# Patient Record
Sex: FEMALE | Race: WHITE | Employment: PART TIME | URBAN - NONMETROPOLITAN AREA
[De-identification: names, ages, dates, MRNs, and addresses within clinical notes are randomized per-mention and may not be internally consistent; named-entity substitution may affect disease eponyms.]

---

## 2023-05-30 ENCOUNTER — HOSPITAL ENCOUNTER (EMERGENCY)
Age: 23
Discharge: HOME | End: 2023-05-30
Payer: COMMERCIAL

## 2023-05-30 VITALS
OXYGEN SATURATION: 97 % | WEIGHT: 275 LBS | HEART RATE: 107 BPM | RESPIRATION RATE: 18 BRPM | TEMPERATURE: 98 F | SYSTOLIC BLOOD PRESSURE: 134 MMHG | DIASTOLIC BLOOD PRESSURE: 90 MMHG

## 2023-05-30 DIAGNOSIS — L05.01 PILONIDAL ABSCESS: Primary | ICD-10-CM

## 2023-05-30 PROCEDURE — 10080 I&D PILONIDAL CYST SIMPLE: CPT

## 2023-05-30 PROCEDURE — 99283 EMERGENCY DEPT VISIT LOW MDM: CPT

## 2023-05-30 PROCEDURE — 87075 CULTR BACTERIA EXCEPT BLOOD: CPT

## 2023-05-30 PROCEDURE — 87205 SMEAR GRAM STAIN: CPT

## 2023-05-30 PROCEDURE — 6370000000 HC RX 637 (ALT 250 FOR IP): Performed by: PHYSICIAN ASSISTANT

## 2023-05-30 PROCEDURE — 87070 CULTURE OTHR SPECIMN AEROBIC: CPT

## 2023-05-30 RX ORDER — SULFAMETHOXAZOLE AND TRIMETHOPRIM 800; 160 MG/1; MG/1
1 TABLET ORAL 2 TIMES DAILY
Qty: 20 TABLET | Refills: 0 | Status: SHIPPED | OUTPATIENT
Start: 2023-05-30 | End: 2023-06-09

## 2023-05-30 RX ADMIN — Medication 3 ML: at 15:30

## 2023-05-30 ASSESSMENT — ENCOUNTER SYMPTOMS
NAUSEA: 0
SHORTNESS OF BREATH: 0
APNEA: 0
BACK PAIN: 0
PHOTOPHOBIA: 0
SORE THROAT: 0
EYE DISCHARGE: 0
EYE PAIN: 0
COUGH: 0
ABDOMINAL DISTENTION: 0
RHINORRHEA: 0
COLOR CHANGE: 0
ABDOMINAL PAIN: 0

## 2023-05-30 NOTE — ED PROVIDER NOTES
[facility] EMERGENCY DEPT  eMERGENCYdEPARTMENT eNCOUnter      Pt Name: Adan Nieto  MRN: [id number]  [place] 2000  Date of evaluation: 5/30/2023  Provider:AZEB Duran    CHIEF COMPLAINT       Chief Complaint   Patient presents with    Cyst     Pt reports pilonidal cyst and 5 prior I&D for this          HISTORY OF PRESENT ILLNESS  (Location/Symptom, Timing/Onset, Context/Setting, Quality, Duration, Modifying Factors, Severity.)   Adan Nieto is a 20 y.o. female who presents to the emergency department with concern for pilonidal abscess has had 5 prior ID for this onset a few days denies fever chills no active discharge. HPI    Nursing Notes were reviewed and I agree. REVIEW OF SYSTEMS    (2-9 systems for level 4, 10 or more for level 5)     Review of Systems   Constitutional:  Negative for activity change, appetite change, chills and fever. HENT:  Negative for congestion, postnasal drip, rhinorrhea and sore throat. Eyes:  Negative for photophobia, pain, discharge and visual disturbance. Respiratory:  Negative for apnea, cough and shortness of breath. Cardiovascular:  Negative for chest pain and leg swelling. Gastrointestinal:  Negative for abdominal distention, abdominal pain and nausea. Genitourinary:  Negative for vaginal bleeding. Musculoskeletal:  Negative for arthralgias, back pain, joint swelling, neck pain and neck stiffness. Skin:  Positive for wound. Negative for color change and rash. Neurological:  Negative for dizziness, syncope, facial asymmetry and headaches. Hematological:  Negative for adenopathy. Does not bruise/bleed easily. Psychiatric/Behavioral:  Negative for agitation, behavioral problems and confusion. Except as noted above the remainder of the review of systems was reviewed and negative. PAST MEDICAL HISTORY   History reviewed. No pertinent past medical history. SURGICAL HISTORY     History reviewed. No pertinent surgical history.       CURRENT

## 2023-05-31 ENCOUNTER — TELEPHONE (OUTPATIENT)
Dept: SURGERY | Age: 23
End: 2023-05-31

## 2023-05-31 NOTE — TELEPHONE ENCOUNTER
Mary Ann's upcoming appointment is needing switched to a new patient visit instead of office visit.  [address] unable to modify  Please advise  Thank you

## 2023-06-02 ENCOUNTER — OFFICE VISIT (OUTPATIENT)
Dept: SURGERY | Age: 23
End: 2023-06-02

## 2023-06-02 VITALS
HEART RATE: 100 BPM | HEIGHT: 71 IN | WEIGHT: 256 LBS | OXYGEN SATURATION: 98 % | TEMPERATURE: 97.4 F | BODY MASS INDEX: 35.84 KG/M2

## 2023-06-02 DIAGNOSIS — L05.01 PILONIDAL ABSCESS: ICD-10-CM

## 2023-06-02 ASSESSMENT — ENCOUNTER SYMPTOMS
EYE REDNESS: 0
ABDOMINAL PAIN: 0
SORE THROAT: 0
CONSTIPATION: 0
CHEST TIGHTNESS: 0
SHORTNESS OF BREATH: 0
FACIAL SWELLING: 0
DIARRHEA: 0
ABDOMINAL DISTENTION: 0
BACK PAIN: 0
VOMITING: 0
EYE DISCHARGE: 0
WHEEZING: 0
EYE PAIN: 0
CHOKING: 0

## 2023-06-02 NOTE — PROGRESS NOTES
SUBJECTIVE:  Ms. Carolyn Lowery is a 20 y.o. female who presents today with an abscess of her gluteal cleft. She has had at least 6 of these requiring I&D in the ER over the last few years. Recently seen for I&D in the ER. Now on Bactrim. No past medical history on file. No past surgical history on file. Current Outpatient Medications   Medication Sig Dispense Refill    sulfamethoxazole-trimethoprim (BACTRIM DS) 800-160 MG per tablet Take 1 tablet by mouth 2 times daily for 10 days 20 tablet 0     No current facility-administered medications for this visit. Allergies: Pcn [penicillins]    No family history on file. Social History     Tobacco Use    Smoking status: Every Day     Packs/day: 0.50     Types: Cigarettes    Smokeless tobacco: Never   Substance Use Topics    Alcohol use: Never       Review of Systems   Constitutional:  Negative for activity change, chills, fatigue and fever. HENT:  Negative for facial swelling, hearing loss and sore throat. Eyes:  Negative for pain, discharge and redness. Respiratory:  Negative for choking, chest tightness, shortness of breath and wheezing. Cardiovascular:  Negative for chest pain and palpitations. Gastrointestinal:  Negative for abdominal distention, abdominal pain, constipation, diarrhea and vomiting. Musculoskeletal:  Negative for back pain, neck pain and neck stiffness. Neurological:  Negative for dizziness, speech difficulty, weakness and numbness. Psychiatric/Behavioral:  Negative for agitation, hallucinations and suicidal ideas. OBJECTIVE:  Pulse 100   Temp 97.4 °F (36.3 °C) (Temporal)   Ht 5' 11\" (1.803 m)   Wt 256 lb (116.1 kg)   SpO2 98%   BMI 35.70 kg/m²   Physical Exam  Constitutional:       Appearance: Normal appearance. HENT:      Head: Normocephalic and atraumatic.       Right Ear: External ear normal.      Left Ear: External ear normal.      Nose: Nose normal.   Eyes:      Pupils: Pupils are equal, round, and reactive to

## 2023-06-03 LAB
BACTERIA SPEC ANAEROBE CULT: ABNORMAL
BACTERIA SPEC ANAEROBE CULT: ABNORMAL
BACTERIA SPEC ANAEROBE+AEROBE CULT: ABNORMAL
BACTERIA SPEC ANAEROBE+AEROBE CULT: ABNORMAL
GRAM STN SPEC: ABNORMAL
ORGANISM: ABNORMAL

## 2023-07-08 ENCOUNTER — HOSPITAL ENCOUNTER (EMERGENCY)
Age: 23
Discharge: HOME | End: 2023-07-08
Attending: PEDIATRICS
Payer: COMMERCIAL

## 2023-07-08 VITALS
DIASTOLIC BLOOD PRESSURE: 76 MMHG | BODY MASS INDEX: 34.87 KG/M2 | SYSTOLIC BLOOD PRESSURE: 132 MMHG | RESPIRATION RATE: 20 BRPM | TEMPERATURE: 98 F | WEIGHT: 250 LBS | OXYGEN SATURATION: 98 % | HEART RATE: 78 BPM

## 2023-07-08 DIAGNOSIS — L05.01 CYST, PILONIDAL, WITH ABSCESS: Primary | ICD-10-CM

## 2023-07-08 PROCEDURE — 99283 EMERGENCY DEPT VISIT LOW MDM: CPT

## 2023-07-08 PROCEDURE — 87075 CULTR BACTERIA EXCEPT BLOOD: CPT

## 2023-07-08 PROCEDURE — 10081 I&D PILONIDAL CYST COMP: CPT

## 2023-07-08 PROCEDURE — 87070 CULTURE OTHR SPECIMN AEROBIC: CPT

## 2023-07-08 PROCEDURE — 87205 SMEAR GRAM STAIN: CPT

## 2023-07-08 PROCEDURE — 2500000003 HC RX 250 WO HCPCS: Performed by: PEDIATRICS

## 2023-07-08 RX ORDER — LIDOCAINE HYDROCHLORIDE AND EPINEPHRINE 10; 10 MG/ML; UG/ML
20 INJECTION, SOLUTION INFILTRATION; PERINEURAL ONCE
Status: COMPLETED | OUTPATIENT
Start: 2023-07-08 | End: 2023-07-08

## 2023-07-08 RX ORDER — SULFAMETHOXAZOLE AND TRIMETHOPRIM 800; 160 MG/1; MG/1
1 TABLET ORAL ONCE
Status: DISCONTINUED | OUTPATIENT
Start: 2023-07-08 | End: 2023-07-09 | Stop reason: HOSPADM

## 2023-07-08 RX ADMIN — LIDOCAINE HYDROCHLORIDE,EPINEPHRINE BITARTRATE 20 ML: 10; .01 INJECTION, SOLUTION INFILTRATION; PERINEURAL at 21:15

## 2023-07-09 LAB
BACTERIA SPEC ANAEROBE+AEROBE CULT: NORMAL
GRAM STN SPEC: NORMAL

## 2023-07-09 RX ORDER — SULFAMETHOXAZOLE AND TRIMETHOPRIM 800; 160 MG/1; MG/1
1 TABLET ORAL 2 TIMES DAILY
Qty: 14 TABLET | Refills: 0 | Status: SHIPPED | OUTPATIENT
Start: 2023-07-09 | End: 2023-07-16

## 2023-07-12 LAB
BACTERIA SPEC ANAEROBE+AEROBE CULT: ABNORMAL
GRAM STN SPEC: ABNORMAL
ORGANISM: ABNORMAL

## 2023-09-27 ENCOUNTER — HOSPITAL ENCOUNTER (EMERGENCY)
Age: 23
Discharge: HOME | End: 2023-09-27

## 2023-09-27 VITALS
HEIGHT: 71 IN | RESPIRATION RATE: 16 BRPM | DIASTOLIC BLOOD PRESSURE: 78 MMHG | OXYGEN SATURATION: 98 % | TEMPERATURE: 98.3 F | SYSTOLIC BLOOD PRESSURE: 135 MMHG | BODY MASS INDEX: 35 KG/M2 | WEIGHT: 250 LBS | HEART RATE: 98 BPM

## 2023-09-27 DIAGNOSIS — L05.01 PILONIDAL CYST WITH ABSCESS: Primary | ICD-10-CM

## 2023-09-27 PROCEDURE — 87070 CULTURE OTHR SPECIMN AEROBIC: CPT

## 2023-09-27 PROCEDURE — 87075 CULTR BACTERIA EXCEPT BLOOD: CPT

## 2023-09-27 PROCEDURE — 10080 I&D PILONIDAL CYST SIMPLE: CPT

## 2023-09-27 PROCEDURE — 99283 EMERGENCY DEPT VISIT LOW MDM: CPT

## 2023-09-27 PROCEDURE — 87205 SMEAR GRAM STAIN: CPT

## 2023-09-27 RX ORDER — SULFAMETHOXAZOLE AND TRIMETHOPRIM 800; 160 MG/1; MG/1
1 TABLET ORAL 2 TIMES DAILY
Qty: 20 TABLET | Refills: 0 | Status: SHIPPED | OUTPATIENT
Start: 2023-09-27 | End: 2023-10-07

## 2023-09-27 ASSESSMENT — ENCOUNTER SYMPTOMS: VOMITING: 0

## 2023-09-27 NOTE — ED PROVIDER NOTES
[facility] EMERGENCY DEPT  eMERGENCY dEPARTMENT eNCOUnter      Pt Name: Diego Sorto  MRN: [id number]  [address] [facility] 2000  Date of evaluation: 9/27/2023  Provider: DUSTY Mejia    CHIEF COMPLAINT       Chief Complaint   Patient presents with    Cyst     Recurrent pilonidal cysts         HISTORY OF PRESENT ILLNESS   (Location/Symptom, Timing/Onset,Context/Setting, Quality, Duration, Modifying Factors, Severity)  Note limiting factors. Diego Sorto is a 24 y.o. female who presents to the emergency department with a pilonidal cyst that keeps reoccurring. She tells this is about the 8th time she has had it drained. NO fever or vomiting. Pt says she can't afford a surgery to have the cyst removed     The history is provided by the patient. Abscess  Location:  Pelvis  Pelvic abscess location:  Gluteal cleft  Size:  2  Abscess quality: induration, painful and warmth    Abscess quality: no redness    Red streaking: no    Duration:  1 day  Progression:  Worsening  Chronicity:  Recurrent  Associated symptoms: no fever and no vomiting    Risk factors: prior abscess        NursingNotes were reviewed. REVIEW OF SYSTEMS    (2-9 systems for level 4, 10 or more for level 5)     Review of Systems   Constitutional:  Negative for fever. Gastrointestinal:  Negative for vomiting. Skin:  Positive for wound. Except as noted above the remainder of the review of systems was reviewed and negative. PAST MEDICAL HISTORY   History reviewed. No pertinent past medical history. SURGICALHISTORY     History reviewed. No pertinent surgical history. CURRENT MEDICATIONS       Discharge Medication List as of 9/27/2023  3:27 PM               Pcn [penicillins]    FAMILY HISTORY     History reviewed. No pertinent family history.        SOCIAL HISTORY       Social History     Socioeconomic History    Marital status: Single     Spouse name: None    Number of children: None    Years of education: None    Highest education

## 2023-10-01 LAB
BACTERIA SPEC ANAEROBE CULT: NORMAL
BACTERIA SPEC ANAEROBE+AEROBE CULT: NORMAL
GRAM STN SPEC: NORMAL

## 2023-10-25 ENCOUNTER — HOSPITAL ENCOUNTER (EMERGENCY)
Age: 23
Discharge: HOME | End: 2023-10-25
Attending: EMERGENCY MEDICINE

## 2023-10-25 VITALS
RESPIRATION RATE: 17 BRPM | OXYGEN SATURATION: 100 % | TEMPERATURE: 98 F | HEART RATE: 88 BPM | DIASTOLIC BLOOD PRESSURE: 82 MMHG | SYSTOLIC BLOOD PRESSURE: 143 MMHG

## 2023-10-25 DIAGNOSIS — L05.01 PILONIDAL CYST WITH ABSCESS: Primary | ICD-10-CM

## 2023-10-25 PROCEDURE — 87070 CULTURE OTHR SPECIMN AEROBIC: CPT

## 2023-10-25 PROCEDURE — 99283 EMERGENCY DEPT VISIT LOW MDM: CPT

## 2023-10-25 PROCEDURE — 87205 SMEAR GRAM STAIN: CPT

## 2023-10-25 PROCEDURE — 87077 CULTURE AEROBIC IDENTIFY: CPT

## 2023-10-25 PROCEDURE — 10080 I&D PILONIDAL CYST SIMPLE: CPT

## 2023-10-25 PROCEDURE — 2500000003 HC RX 250 WO HCPCS: Performed by: EMERGENCY MEDICINE

## 2023-10-25 RX ORDER — SULFAMETHOXAZOLE AND TRIMETHOPRIM 800; 160 MG/1; MG/1
1 TABLET ORAL 2 TIMES DAILY
Qty: 14 TABLET | Refills: 0 | Status: SHIPPED | OUTPATIENT
Start: 2023-10-25 | End: 2023-11-01

## 2023-10-25 RX ORDER — LIDOCAINE HYDROCHLORIDE AND EPINEPHRINE 10; 10 MG/ML; UG/ML
20 INJECTION, SOLUTION INFILTRATION; PERINEURAL ONCE
Status: COMPLETED | OUTPATIENT
Start: 2023-10-25 | End: 2023-10-25

## 2023-10-25 RX ADMIN — LIDOCAINE HYDROCHLORIDE,EPINEPHRINE BITARTRATE 20 ML: 10; .01 INJECTION, SOLUTION INFILTRATION; PERINEURAL at 08:44

## 2023-10-25 ASSESSMENT — PAIN SCALES - GENERAL
PAINLEVEL_OUTOF10: 0
PAINLEVEL_OUTOF10: 4

## 2023-10-25 ASSESSMENT — ENCOUNTER SYMPTOMS
ABDOMINAL PAIN: 0
VOMITING: 0
RHINORRHEA: 0
SORE THROAT: 0
NAUSEA: 0
SHORTNESS OF BREATH: 0
DIARRHEA: 0
SINUS PRESSURE: 0

## 2023-10-25 ASSESSMENT — PAIN DESCRIPTION - LOCATION: LOCATION: RECTUM

## 2023-10-25 ASSESSMENT — PAIN - FUNCTIONAL ASSESSMENT: PAIN_FUNCTIONAL_ASSESSMENT: 0-10

## 2023-10-25 NOTE — CARE COORDINATION
Consult: Sharon Chatterjee received an update on Pt and Pt needs. This writer met with Pt at bedside to discuss community resource needs. This writer provided the Pt with information on community resources at that time and updated the Pt Nurse and Physician.

## 2023-10-25 NOTE — DISCHARGE INSTRUCTIONS
Take antibiotics as directed. Follow-up with Dr. Carin Bonner for definitive care. Take steps to overcome barriers to definitive care. Return immediately to the emergency room for any new or worsening symptoms. Take Tylenol or Motrin for pain. Keep the wound clean and dry. The packing needs to be removed in 24 to 48 hours.

## 2023-10-25 NOTE — ED PROVIDER NOTES
[facility] EMERGENCY DEPT  eMERGENCY dEPARTMENT eNCOUnter      Pt Name: Angel Cordero  MRN: [id number]  [address] [facility] [place] 2000  Date of evaluation: 10/25/2023  Provider: Yu Koehler MD    CHIEF COMPLAINT       Chief Complaint   Patient presents with    Cyst     Pt reports recurring pilonidal cyst         HISTORY OF PRESENT ILLNESS   (Location/Symptom, Timing/Onset,Context/Setting, Quality, Duration, Modifying Factors, Severity)  Note limiting factors. Angel Cordero is a 24 y.o. female who presents to the emergency department recurrent pilonidal cyst.    HPI    Patient is a 50-year-old white female with a history of recurrent pilonidal cyst who presents with pain at the apex of the cleft of her buttocks in the area where she has had multiple abscess drainage procedures but is failed to follow-up for definitive care secondary in part to her work schedule and financial concerns. Each time as many as 8 or 9 times she reports she has had her cyst drained she feels better for a while and then it recurs within 2 months. No fever. No nausea or vomiting. She reports she came in early this time because she knows that he will not get better without drainage. She is alert awake and cooperative in the ED. NursingNotes were reviewed. REVIEW OF SYSTEMS    (2-9 systems for level 4, 10 or more for level 5)     Review of Systems   Constitutional:  Negative for chills, diaphoresis, fatigue and fever. HENT:  Negative for rhinorrhea, sinus pressure and sore throat. Eyes:  Negative for visual disturbance. Respiratory:  Negative for shortness of breath. Cardiovascular:  Negative for chest pain. Gastrointestinal:  Negative for abdominal pain, diarrhea, nausea and vomiting. Genitourinary:  Negative for difficulty urinating and dysuria. Musculoskeletal:  Negative for arthralgias and myalgias. Skin:  Negative for rash. Neurological:  Negative for weakness. Psychiatric/Behavioral:  Negative for confusion.     All other

## 2023-10-28 LAB
BACTERIA SPEC ANAEROBE+AEROBE CULT: ABNORMAL
BACTERIA SPEC ANAEROBE+AEROBE CULT: ABNORMAL
GRAM STN SPEC: ABNORMAL
ORGANISM: ABNORMAL
ORGANISM: ABNORMAL